# Patient Record
(demographics unavailable — no encounter records)

---

## 2025-01-13 NOTE — END OF VISIT
[TextEntry] : ISada, am scribing for and the presence of Dr. Mona Saba the following sections HISTORY OF PRESENT ILLNESS, PAST MEDICAL/FAMILY/SOCIAL HISTORY; REVIEW OF SYSTEMS; PHYSICAL EXAM; DISPOSITION.

## 2025-01-13 NOTE — PLAN
[FreeTextEntry1] : Menopause and hot flashes discussed. HRT risks benefits reviewed. Alternative options such as SSRI discussed. Pt. not interested in tx at this time but will call if desires tx.  Yearly mammo Recommend colonoscopy

## 2025-01-13 NOTE — HISTORY OF PRESENT ILLNESS
[FreeTextEntry1] : 55 yo  presents for preventative exam. LMP . +hot flashes at night causing insomnia.  Last mammo 10/23. H/o breast biopsy  which she reports was benign.  Never had colonoscopy but has upcoming appt.  Hereditary cancer gene screening negative for known mutations